# Patient Record
Sex: FEMALE | Race: OTHER | URBAN - METROPOLITAN AREA
[De-identification: names, ages, dates, MRNs, and addresses within clinical notes are randomized per-mention and may not be internally consistent; named-entity substitution may affect disease eponyms.]

---

## 2021-01-05 ENCOUNTER — HOSPITAL ENCOUNTER (EMERGENCY)
Age: 41
Discharge: HOME | End: 2021-01-05

## 2021-01-05 VITALS
HEART RATE: 85 BPM | TEMPERATURE: 98 F | RESPIRATION RATE: 16 BRPM | OXYGEN SATURATION: 100 % | WEIGHT: 187.17 LBS | DIASTOLIC BLOOD PRESSURE: 74 MMHG | BODY MASS INDEX: 31.95 KG/M2 | SYSTOLIC BLOOD PRESSURE: 118 MMHG | HEIGHT: 64 IN

## 2021-01-05 DIAGNOSIS — Z3A.22 22 WEEKS GESTATION OF PREGNANCY: ICD-10-CM

## 2021-01-05 DIAGNOSIS — R04.0 EPISTAXIS: Primary | ICD-10-CM

## 2021-01-05 PROCEDURE — 6370000000 HC RX 637 (ALT 250 FOR IP): Performed by: PHYSICIAN ASSISTANT

## 2021-01-05 PROCEDURE — 30901 CONTROL OF NOSEBLEED: CPT

## 2021-01-05 PROCEDURE — 99284 EMERGENCY DEPT VISIT MOD MDM: CPT

## 2021-01-05 RX ORDER — OXYMETAZOLINE HYDROCHLORIDE 0.05 G/100ML
2 SPRAY NASAL ONCE
Status: COMPLETED | OUTPATIENT
Start: 2021-01-05 | End: 2021-01-05

## 2021-01-05 RX ADMIN — OXYMETAZOLINE HCL 2 SPRAY: 0.05 SPRAY NASAL at 08:56

## 2021-01-05 NOTE — ED PROVIDER NOTES
[address] [facility]        Pt Name: Juvencio Drew  MRN: [id number]  [place] 1980  Date of evaluation: 1/5/2021  Provider: Jasper Hansen PA-C  PCP: No primary care provider on file. HERNÁN. I have evaluated this patient. My supervising physician was available for consultation. Katherine Cosme MD      CHIEF COMPLAINT       Chief Complaint   Patient presents with    Epistaxis     right nares x 30 min PTA. 22 weeks pregnant. No complications       HISTORY OF PRESENT ILLNESS   (Location, Timing/Onset, Context/Setting, Quality, Duration, Modifying Factors, Severity, Associated Signs and Symptoms)  Note limiting factors. Juvencio Drew is a 41 y.o. female patient resenting with complaint right nasal bleed onset 8 AM or 1 hour before my evaluation. States she had about a month ago with a small bleed and this was not problematic. She states this 1 has bled a bit more than the other. She contacted EMS and she was brought to the ED for evaluation. She is not on anticoagulants. The patient is 22 weeks pregnant. No headache, dizziness, chest pain or shortness of breath. No history of coagulation disorders. She has not self initiated any appropriate treatment to control nasal bleed. Nursing Notes were all reviewed and agreed with or any disagreements were addressed in the HPI. REVIEW OF SYSTEMS    (2-9 systems for level 4, 10 or more for level 5)     Review of Systems    Positives and Pertinent negatives as per HPI. Except as noted above in the ROS, all other systems were reviewed and negative. PAST MEDICAL HISTORY     Past Medical History:   Diagnosis Date    Gestational diabetes          SURGICAL HISTORY   History reviewed. No pertinent surgical history. CURRENTMEDICATIONS       There are no discharge medications for this patient. ALLERGIES     Amoxicillin    FAMILYHISTORY     History reviewed.  No pertinent family history. SOCIAL HISTORY       Social History     Tobacco Use    Smoking status: Never Smoker   Substance Use Topics    Alcohol use: No    Drug use: No       SCREENINGS             PHYSICAL EXAM    (up to 7 for level 4, 8 or more for level 5)     ED Triage Vitals [01/05/21 0846]   BP Temp Temp Source Pulse Resp SpO2 Height Weight   118/74 98 °F (36.7 °C) Oral 85 16 100 % 5' 4\" (1.626 m) 187 lb 2.7 oz (84.9 kg)       Physical Exam  Vitals signs and nursing note reviewed. Constitutional:       Appearance: She is well-developed. HENT:      Head: Normocephalic and atraumatic. Right Ear: External ear normal.      Left Ear: External ear normal.      Nose:      Comments: Patient has evidence of a right nasal bleed. Eyes:      General: No scleral icterus. Right eye: No discharge. Left eye: No discharge. Conjunctiva/sclera: Conjunctivae normal.   Neck:      Musculoskeletal: Normal range of motion and neck supple. Cardiovascular:      Rate and Rhythm: Normal rate and regular rhythm. Heart sounds: Normal heart sounds. Pulmonary:      Effort: Pulmonary effort is normal.      Breath sounds: Normal breath sounds. Musculoskeletal: Normal range of motion. Skin:     General: Skin is warm and dry. Neurological:      General: No focal deficit present. Mental Status: She is alert and oriented to person, place, and time. Mental status is at baseline. Psychiatric:         Mood and Affect: Mood normal.         Behavior: Behavior normal.         Thought Content: Thought content normal.         Judgment: Judgment normal.         DIAGNOSTIC RESULTS   LABS:    Labs Reviewed - No data to display    All other labs were within normal range or not returned as of this dictation. EKG: All EKG's are interpreted by the Emergency Department Physician in the absence of a cardiologist.  Please see their note for interpretation of EKG.       RADIOLOGY:   Non-plain film images such as CT, Ultrasound and MRI are read by the radiologist. Plain radiographic images are visualized and preliminarily interpreted by the ED Provider with the below findings:        Interpretation per the Radiologist below, if available at the time of this note:    No orders to display     No results found. PROCEDURES     At 9:12 AM I had patient remove the nasal clip applied by staff. I then had patient blow all clot material from the nose. Minimal clot material released. Nasal passage appears patent. No obvious concerning bleed at this time. I use liberal Afrin spray up the right nostril. Pressure reinitiated. I will reassess patient at about 15-20 minutes. On reassessment and reexamination the patient has no further bleeding. No vascular prominence is appreciated. Procedures    CRITICAL CARE TIME   N/A    CONSULTS:  None      EMERGENCY DEPARTMENT COURSE and DIFFERENTIAL DIAGNOSIS/MDM:   Vitals:    Vitals:    01/05/21 0846   BP: 118/74   Pulse: 85   Resp: 16   Temp: 98 °F (36.7 °C)   TempSrc: Oral   SpO2: 100%   Weight: 187 lb 2.7 oz (84.9 kg)   Height: 5' 4\" (1.626 m)       Patient was given the following medications:  Medications   oxymetazoline (AFRIN) 0.05 % nasal spray 2 spray (2 sprays Each Nostril Given 1/5/21 0856)           Patient presenting with epistaxis beginning 1 hour before my assessment. Patient not actively bleeding. Nasal clip on patient. I used Afrin nasal spray liberally. Reassessed patient after 15-20 minutes no rebleed. Reinspection of the nasal passage shows no vascular prominence. Patient instructed on treatment at home. Referred to ENT and PCP. Patient follow with OB as she is 22 weeks gestation. She indicates no other related complaints at this time. The patient does express understanding the diagnosis and the treatment plan.     FINAL IMPRESSION      1. Epistaxis    2. 22 weeks gestation of pregnancy          DISPOSITION/PLAN   DISPOSITION Decision To Discharge 01/05/2021 09:41:47 AM      PATIENT REFERREDTO:  ROE Valdes 81  [address]  [phone]    Schedule an appointment as soon as possible for a visit in 2 days      Your doctor    Schedule an appointment as soon as possible for a visit in 2 days      [facility] Emergency Department  [address] [address]  [phone]  Go to   If symptoms worsen      DISCHARGE MEDICATIONS:  There are no discharge medications for this patient. DISCONTINUED MEDICATIONS:  There are no discharge medications for this patient. (Please note that portions of this note were completed with a voice recognition program.  Efforts were made to edit the dictations but occasionally words are mis-transcribed. )    Barney Salas PA-C (electronically signed)           Barney Salas PA-C  01/05/21 0908

## 2021-01-05 NOTE — ED NOTES
Acknowledged pt by pt's name. Verified pt by name and date of birth. Checked arm band, allergies, reviewed past medical history. Introduced myself to patient  Duration of ED plan of care explained to patient  Explained planned tests and procedures  Thanked patient for coming to [facility].    Asked if there was anything else I could do for the patient before exiting room. CB in reach.      Diomedes Mackenzie RN  01/05/21 7060

## 2021-02-23 ENCOUNTER — TELEPHONE (OUTPATIENT)
Dept: ENT CLINIC | Age: 41
End: 2021-02-23

## 2024-01-02 ENCOUNTER — HOSPITAL ENCOUNTER (OUTPATIENT)
Dept: MAMMOGRAPHY | Age: 44
Discharge: HOME | End: 2024-01-02
Payer: COMMERCIAL

## 2024-01-02 VITALS — BODY MASS INDEX: 26.29 KG/M2 | HEIGHT: 64 IN | WEIGHT: 154 LBS

## 2024-01-02 DIAGNOSIS — Z12.31 VISIT FOR SCREENING MAMMOGRAM: ICD-10-CM

## 2024-01-02 PROCEDURE — 77063 BREAST TOMOSYNTHESIS BI: CPT
